# Patient Record
Sex: FEMALE | Race: ASIAN | NOT HISPANIC OR LATINO
[De-identification: names, ages, dates, MRNs, and addresses within clinical notes are randomized per-mention and may not be internally consistent; named-entity substitution may affect disease eponyms.]

---

## 2017-11-28 ENCOUNTER — APPOINTMENT (OUTPATIENT)
Dept: MAMMOGRAPHY | Facility: CLINIC | Age: 77
End: 2017-11-28
Payer: MEDICARE

## 2017-11-28 ENCOUNTER — APPOINTMENT (OUTPATIENT)
Dept: ULTRASOUND IMAGING | Facility: CLINIC | Age: 77
End: 2017-11-28
Payer: MEDICARE

## 2017-11-28 ENCOUNTER — OUTPATIENT (OUTPATIENT)
Dept: OUTPATIENT SERVICES | Facility: HOSPITAL | Age: 77
LOS: 1 days | End: 2017-11-28
Payer: MEDICARE

## 2017-11-28 DIAGNOSIS — Z00.8 ENCOUNTER FOR OTHER GENERAL EXAMINATION: ICD-10-CM

## 2017-11-28 PROCEDURE — 77063 BREAST TOMOSYNTHESIS BI: CPT | Mod: 26

## 2017-11-28 PROCEDURE — G0202: CPT | Mod: 26

## 2017-11-28 PROCEDURE — 77067 SCR MAMMO BI INCL CAD: CPT

## 2017-11-28 PROCEDURE — 76641 ULTRASOUND BREAST COMPLETE: CPT | Mod: 26,50

## 2017-11-28 PROCEDURE — 77063 BREAST TOMOSYNTHESIS BI: CPT

## 2017-11-28 PROCEDURE — 76641 ULTRASOUND BREAST COMPLETE: CPT

## 2018-02-26 ENCOUNTER — APPOINTMENT (OUTPATIENT)
Dept: VASCULAR SURGERY | Facility: CLINIC | Age: 78
End: 2018-02-26
Payer: MEDICARE

## 2018-02-26 PROCEDURE — 93923 UPR/LXTR ART STDY 3+ LVLS: CPT

## 2018-03-14 ENCOUNTER — APPOINTMENT (OUTPATIENT)
Dept: VASCULAR SURGERY | Facility: CLINIC | Age: 78
End: 2018-03-14
Payer: MEDICARE

## 2018-03-14 ENCOUNTER — LABORATORY RESULT (OUTPATIENT)
Age: 78
End: 2018-03-14

## 2018-03-14 VITALS
BODY MASS INDEX: 20.77 KG/M2 | SYSTOLIC BLOOD PRESSURE: 183 MMHG | HEIGHT: 61 IN | WEIGHT: 110 LBS | TEMPERATURE: 98.3 F | DIASTOLIC BLOOD PRESSURE: 73 MMHG | HEART RATE: 80 BPM

## 2018-03-14 DIAGNOSIS — M79.672 PAIN IN LEFT FOOT: ICD-10-CM

## 2018-03-14 DIAGNOSIS — I73.9 PERIPHERAL VASCULAR DISEASE, UNSPECIFIED: ICD-10-CM

## 2018-03-14 PROCEDURE — 99204 OFFICE O/P NEW MOD 45 MIN: CPT

## 2018-03-20 ENCOUNTER — APPOINTMENT (OUTPATIENT)
Dept: VASCULAR SURGERY | Facility: HOSPITAL | Age: 78
End: 2018-03-20

## 2018-03-20 ENCOUNTER — OUTPATIENT (OUTPATIENT)
Dept: OUTPATIENT SERVICES | Facility: HOSPITAL | Age: 78
LOS: 1 days | Discharge: HOME | End: 2018-03-20
Payer: MEDICARE

## 2018-03-20 ENCOUNTER — APPOINTMENT (OUTPATIENT)
Dept: VASCULAR SURGERY | Facility: AMBULATORY SURGERY CENTER | Age: 78
End: 2018-03-20

## 2018-03-20 DIAGNOSIS — I73.9 PERIPHERAL VASCULAR DISEASE, UNSPECIFIED: ICD-10-CM

## 2018-03-20 PROCEDURE — 99152 MOD SED SAME PHYS/QHP 5/>YRS: CPT

## 2018-03-20 PROCEDURE — 37226: CPT | Mod: LT

## 2018-03-20 PROCEDURE — 75625 CONTRAST EXAM ABDOMINL AORTA: CPT | Mod: 26,59

## 2018-03-20 PROCEDURE — 37228: CPT | Mod: LT

## 2018-03-20 PROCEDURE — 75716 ARTERY X-RAYS ARMS/LEGS: CPT | Mod: 26,59

## 2018-03-20 RX ORDER — SODIUM CHLORIDE 9 MG/ML
3 INJECTION INTRAMUSCULAR; INTRAVENOUS; SUBCUTANEOUS EVERY 8 HOURS
Qty: 0 | Refills: 0 | Status: DISCONTINUED | OUTPATIENT
Start: 2018-03-20 | End: 2018-04-04

## 2018-03-20 RX ORDER — OMEGA-3 ACID ETHYL ESTERS 1 G
0 CAPSULE ORAL
Qty: 0 | Refills: 0 | COMMUNITY

## 2018-03-20 RX ORDER — ZOLPIDEM TARTRATE 10 MG/1
1 TABLET ORAL
Qty: 0 | Refills: 0 | COMMUNITY

## 2018-03-20 RX ORDER — OMEPRAZOLE 10 MG/1
1 CAPSULE, DELAYED RELEASE ORAL
Qty: 0 | Refills: 0 | COMMUNITY

## 2018-03-20 RX ORDER — METFORMIN HYDROCHLORIDE 850 MG/1
1 TABLET ORAL
Qty: 0 | Refills: 0 | COMMUNITY

## 2018-03-20 RX ORDER — METOPROLOL TARTRATE 50 MG
1 TABLET ORAL
Qty: 0 | Refills: 0 | COMMUNITY

## 2018-03-20 RX ORDER — NIFEDIPINE 30 MG
1 TABLET, EXTENDED RELEASE 24 HR ORAL
Qty: 0 | Refills: 0 | COMMUNITY

## 2018-03-20 RX ORDER — ASPIRIN/CALCIUM CARB/MAGNESIUM 324 MG
1 TABLET ORAL
Qty: 0 | Refills: 0 | COMMUNITY

## 2018-03-20 RX ORDER — ROSUVASTATIN CALCIUM 5 MG/1
1 TABLET ORAL
Qty: 0 | Refills: 0 | COMMUNITY

## 2018-03-20 RX ORDER — CLOPIDOGREL BISULFATE 75 MG/1
1 TABLET, FILM COATED ORAL
Qty: 30 | Refills: 0 | OUTPATIENT
Start: 2018-03-20 | End: 2018-04-18

## 2018-03-20 RX ORDER — INSULIN LISPRO 100 [IU]/ML
20 INJECTION, SUSPENSION SUBCUTANEOUS
Qty: 0 | Refills: 0 | COMMUNITY

## 2018-03-20 RX ORDER — LOSARTAN/HYDROCHLOROTHIAZIDE 100MG-25MG
1 TABLET ORAL
Qty: 0 | Refills: 0 | COMMUNITY

## 2018-03-20 RX ORDER — GEMFIBROZIL 600 MG
1 TABLET ORAL
Qty: 0 | Refills: 0 | COMMUNITY

## 2018-03-20 NOTE — H&P CARDIOLOGY - HISTORY OF PRESENT ILLNESS
77 y.o. female presents today for elective LLE angiogram. The patient c/o LLE pain with walking and at rest. Admits to LLE skin color change, "became red at toe area". Denies ulcers. The patient was evaluated by her podiatrist, was referred to a vascular surgeon. was found to have abnormal ? doppler LLE study. Due to patient's symptoms and abnormal non invasive tests, the patient was recommended to have LLE angiogram/angiolplasty. 77 y.o. female presents today for elective LLE angiogram. The patient c/o LLE pain with walking and at rest. Admits to LLE skin color change, "became red at the toe area". Denies ulcers. The patient was evaluated by her podiatrist, was referred to a vascular surgeon. was found to have abnormal ? doppler LLE study. Due to patient's symptoms and abnormal non invasive tests, the patient was recommended to have LLE angiogram/angiolplasty.

## 2018-03-20 NOTE — H&P CARDIOLOGY - PMH
Breast Cancer  "LEFT breast 2002" was on arimedex for 6 years---stopped 3 years ago  Breast Lump  RIGHT 7/2011  Breast Lump  LEFT 2002  DM (Diabetes Mellitus)    Glaucoma  "borderline"  HTN (Hypertension)    Hypercholesterolemia

## 2018-03-20 NOTE — H&P CARDIOLOGY - EXTREMITIES COMMENTS
L foot - + distal erythema, no cyanosis, + nonhealing dry ulcer over medial aspect of L great toe (as per patient post corn removal by podiatrist)

## 2018-04-04 ENCOUNTER — APPOINTMENT (OUTPATIENT)
Dept: VASCULAR SURGERY | Facility: CLINIC | Age: 78
End: 2018-04-04
Payer: MEDICARE

## 2018-04-04 VITALS
SYSTOLIC BLOOD PRESSURE: 151 MMHG | WEIGHT: 110 LBS | TEMPERATURE: 97.8 F | BODY MASS INDEX: 20.77 KG/M2 | HEIGHT: 61 IN | DIASTOLIC BLOOD PRESSURE: 71 MMHG | HEART RATE: 84 BPM

## 2018-04-04 PROCEDURE — 99212 OFFICE O/P EST SF 10 MIN: CPT

## 2018-04-18 ENCOUNTER — RX RENEWAL (OUTPATIENT)
Age: 78
End: 2018-04-18

## 2018-04-18 RX ORDER — CLOPIDOGREL BISULFATE 75 MG/1
75 TABLET, FILM COATED ORAL DAILY
Qty: 30 | Refills: 3 | Status: ACTIVE | COMMUNITY
Start: 2018-04-18 | End: 1900-01-01

## 2019-07-26 ENCOUNTER — APPOINTMENT (OUTPATIENT)
Dept: ULTRASOUND IMAGING | Facility: CLINIC | Age: 79
End: 2019-07-26
Payer: MEDICARE

## 2019-07-26 ENCOUNTER — APPOINTMENT (OUTPATIENT)
Dept: MAMMOGRAPHY | Facility: CLINIC | Age: 79
End: 2019-07-26
Payer: MEDICARE

## 2019-07-26 ENCOUNTER — OUTPATIENT (OUTPATIENT)
Dept: OUTPATIENT SERVICES | Facility: HOSPITAL | Age: 79
LOS: 1 days | End: 2019-07-26
Payer: MEDICARE

## 2019-07-26 DIAGNOSIS — Z00.8 ENCOUNTER FOR OTHER GENERAL EXAMINATION: ICD-10-CM

## 2019-07-26 PROCEDURE — 77063 BREAST TOMOSYNTHESIS BI: CPT | Mod: 26

## 2019-07-26 PROCEDURE — 77063 BREAST TOMOSYNTHESIS BI: CPT

## 2019-07-26 PROCEDURE — 76641 ULTRASOUND BREAST COMPLETE: CPT | Mod: 26,50

## 2019-07-26 PROCEDURE — 77067 SCR MAMMO BI INCL CAD: CPT

## 2019-07-26 PROCEDURE — 76641 ULTRASOUND BREAST COMPLETE: CPT

## 2019-07-26 PROCEDURE — 77067 SCR MAMMO BI INCL CAD: CPT | Mod: 26

## 2023-07-23 NOTE — H&P CARDIOLOGY - CONSTITUTIONAL
[facility] Medicine Services  PROGRESS NOTE    Patient Name: Lissett Lowry  : 1950  MRN: [id number]    Date of Admission: 2023  Primary Care Physician: Konstantin Gibson MD    Subjective   Subjective     CC:  Emesis, abdominal pain    HPI:  Feels ok.  States that having some liquid stools.      ROS:  Gen- No fevers, chills  CV- No chest pain, palpitations  Resp- No cough, dyspnea  GI- No N/V/D, abd pain      Objective   Objective     Vital Signs:   Temp:  [97.8 °F (36.6 °C)-98.4 °F (36.9 °C)] 98 °F (36.7 °C)  Heart Rate:  [59-66] 65  Resp:  [16-18] 18  BP: (133-151)/(66-76) 141/66  Flow (L/min):  [2] 2     Physical Exam:  Constitutional: No acute distress, awake, alert,  at bedside  HENT: NCAT, mucous membranes moist, NGT clamped  Respiratory: Clear to auscultation bilaterally, respiratory effort normal   Cardiovascular: RRR, no murmurs, rubs, or gallops  Gastrointestinal: Positive bowel sounds, soft, nontender, nondistended, large reducible left ventral hernia  Musculoskeletal: No bilateral ankle edema  Psychiatric: Appropriate affect, cooperative  Neurologic: Oriented x 3, strength symmetric in all extremities, Cranial Nerves grossly intact to confrontation, speech clear  Skin: No rashes      Results Reviewed:  LAB RESULTS:      Lab 23  0822 23  0354 23  1125 23  0843   WBC 5.24 6.85  --  16.30*   HEMOGLOBIN 11.0* 11.2*  --  14.8   HEMATOCRIT 33.0* 33.7*  --  44.2   PLATELETS 149 191  --  294   NEUTROS ABS  --   --   --  14.58*   IMMATURE GRANS (ABS)  --   --   --  0.11*   LYMPHS ABS  --   --   --  0.83   MONOS ABS  --   --   --  0.77   EOS ABS  --   --   --  0.00   MCV 96.2 94.7  --  93.6   LACTATE  --   --  1.4 3.1*         Lab 23  1221 23  0353 23  2216 23  1752 23  0822 23  1550 23  0354 23  0843   SODIUM  --  148*  --   --  144  --  144 142   POTASSIUM  --  4.2  --  3.8 3.6 3.5 3.6 4.1    CHLORIDE  --  114*  --   --  110*  --  105 99   CO2  --  23.0  --   --  26.0  --  29.0 27.0   ANION GAP  --  11.0  --   --  8.0  --  10.0 16.0*   BUN  --  16  --   --  27*  --  64* 64*   CREATININE  --  0.57  --   --  0.66  --  1.36* 1.94*   EGFR  --  96.1  --   --  92.8  --  41.2* 27.1*   GLUCOSE  --  113*  --   --  79  --  100* 189*   CALCIUM  --  9.8  --   --  9.9  --  9.9 11.5*   MAGNESIUM  --  1.5*  --   --  1.7  --   --   --    PHOSPHORUS 2.3*  --  1.5* 1.4* 1.4*  --   --   --          Lab 07/20/23  0843   TOTAL PROTEIN 7.7   ALBUMIN 4.4   GLOBULIN 3.3   ALT (SGPT) 17   AST (SGOT) 23   BILIRUBIN 0.6   ALK PHOS 74   LIPASE 15         Lab 07/20/23  1125 07/20/23  0843   HSTROP T 30* 35*                 Brief Urine Lab Results  (Last result in the past 365 days)        Color   Clarity   Blood   Leuk Est   Nitrite   Protein   CREAT   Urine HCG        07/20/23 1209 Dark Yellow   Cloudy   Negative   Moderate (2+)   Negative   Trace                   Microbiology Results Abnormal       Procedure Component Value - Date/Time    Blood Culture - Blood, Arm, Right [[id number]]  (Normal) Collected: 07/20/23 1220    Lab Status: Preliminary result Specimen: Blood from Arm, Right Updated: 07/23/23 1245     Blood Culture No growth at 3 days            Adult Transthoracic Echo Limited W/ Cont if Necessary Per Protocol    Result Date: 7/21/2023    Left ventricular systolic function is normal. Left ventricular ejection fraction appears to be 61 - 65%.   Moderate mitral annular calcification is present.  Cannot exclude a small adherent vegetation on the posterior surface of the mitral valve.  There is no significant mitral valve stenosis or regurgitation   Other cardiac valves appear normal with no evidence of vegetation   Recommend transesophageal echocardiogram for definitive assessment for endocarditis     FL Small Bowel Follow Through Single-Contrast    Result Date: 7/22/2023  FL SMALL BOWEL FOLLOW THROUGH SINGLE-CONTRAST  Date of Exam: 7/22/2023 10:00 AM EDT Indication: sbo. Comparison: None available. Technique:   image of the abdomen was obtained. Patient ingested medium density barium for single contrast imaging of the small bowel.  Examination was recorded with multiple large field of view radiographs and spot fluoroscopic images. Findings: Initial  view of the abdomen demonstrates gas-filled segments of both small and large bowel, without evidence of obstruction. A nasogastric tube is noted in the stomach. There is no overt pneumoperitoneum. Water-soluble contrast was subsequently administered via the nasogastric tube. Subsequent 30-minute, 2-hour and 4-hour images demonstrate distal progression of contrast, with right colon visualized on the 4-hour image. There is no extravasation of contrast.     Impression: Impression: Somewhat delayed transit of contrast with gas-filled segments of both small and large bowel suggesting ileus. No evidence of high-grade obstruction. Electronically Signed: Tyrone Ceja  7/22/2023 7:34 PM EDT  Workstation ID: [id number]     Results for orders placed during the hospital encounter of 07/20/23    Adult Transthoracic Echo Limited W/ Cont if Necessary Per Protocol    Interpretation Summary    Left ventricular systolic function is normal. Left ventricular ejection fraction appears to be 61 - 65%.    Moderate mitral annular calcification is present.  Cannot exclude a small adherent vegetation on the posterior surface of the mitral valve.  There is no significant mitral valve stenosis or regurgitation    Other cardiac valves appear normal with no evidence of vegetation    Recommend transesophageal echocardiogram for definitive assessment for endocarditis      Current medications:  Scheduled Meds:ceFAZolin, 2,000 mg, Intravenous, Q8H  famotidine, 20 mg, Oral, BID AC  heparin (porcine), 5,000 Units, Subcutaneous, Q12H  insulin regular, 2-7 Units, Subcutaneous, Q6H  nebivolol, 10 mg, Oral,  Daily  polyethylene glycol, 17 g, Oral, Daily  rosuvastatin, 5 mg, Oral, Nightly  sertraline, 100 mg, Oral, Daily  sodium chloride, 10 mL, Intravenous, Q12H      Continuous Infusions:dextrose 5 % and sodium chloride 0.45 % with KCl 20 mEq/L, 75 mL/hr, Last Rate: 75 mL/hr (07/22/23 6448)      PRN Meds:.  acetaminophen    Calcium Replacement - Follow Nurse / BPA Driven Protocol    dextrose    dextrose    glucagon (human recombinant)    Magnesium Standard Dose Replacement - Follow Nurse / BPA Driven Protocol    Morphine **AND** naloxone    Phosphorus Replacement - Follow Nurse / BPA Driven Protocol    Potassium Replacement - Follow Nurse / BPA Driven Protocol    sodium chloride    sodium chloride    Assessment & Plan   Assessment & Plan     Active Hospital Problems    Diagnosis  POA    **SBO (small bowel obstruction) [K56.609]  Yes    Severe malnutrition [E43]  Yes    Sjogren syndrome, unspecified [M35.00]  Unknown    HTN (hypertension) [I10]  Yes    HLD (hyperlipidemia) [E78.5]  Yes    PHYLLIS on CPAP [G47.33, Z99.89]  Not Applicable    Controlled type 2 diabetes mellitus without complication, with long-term current use of insulin [E11.9, Z79.4]  Not Applicable      Resolved Hospital Problems   No resolved problems to display.        Brief Hospital Course to date:  Lissett Lowry is a 73 y.o. female  w h/o large abdominal hernia, undergoing OP w/u for surgery w Dr FAYE, DMII, h/o PE 2021 gary-op who presents after 2+ days of abdominal pain, dark emesis. Found to have SBO 2/2 abdominal wall hernia     SBO 2/2 abdominal wall hernia  -surgery following.  Recs NPO,  IVF.  Holding xarelto  -having BM's.  SBFT somewhat delayed/?ileus, without gross obstruction.  Recs abdominal binder at all times.   Started clear liquids      RLL PNA, likely aspiration in setting of significant vomiting  -Note has hives to cefadroxil which has side chain in common w unasyn + zosyn. -Continue cefazolin  -ID following.     MSSA  --ID following.   Recs cefazolin, plans PICC which can be placed after blood cultures cleared x 72hours, plans for at least 4 weeks IV abx.  Follow cultures.    --echo shows EF 61-65%, CAN NOT Exclude small vegetation on posterior surface of MV.  Will need JENNIFER, ordered for AM.  NPO after MN    H/o PE 2021 - PE was perioperative in 2021, on low-dose xarelto since. Last dose 7/17 PM. Will hold in case need for surgery, DVT ppx throughout gary-op period given history     KARLA  --resolved with IVF  --holding bumex     Chronic bumex use - family denies h/o CHF but on high dose bumex. Hold for now given dehydration.      DMII with hypoglycemia  - hold ozempic. Low-dose  SSI  --has been on D5 1/2NS to maintain glucose but sodium increasing so change IVF to D5W at 25/hr. Hopefully glucose will improve as diet advanced    Sjogren - hold HCQ for now  HLD - statin  HTN - continue nebivolol, hold other BP meds  Mood disorder - home sertraline  Multiple listed medication allergies - complicates care, consider allergy referral as OP    D/w  at bedside on 7/23/23      Expected Discharge Location and Transportation:   Expected Discharge   Expected Discharge Date: 7/27/2023; Expected Discharge Time:      DVT prophylaxis:  Medical DVT prophylaxis orders are present.     AM-PAC 6 Clicks Score (PT): 22 (07/22/23 2100)    CODE STATUS:   Code Status and Medical Interventions:   Ordered at: 07/20/23 1417     Level Of Support Discussed With:    Patient    Next of Kin (If No Surrogate)     Code Status (Patient has no pulse and is not breathing):    CPR (Attempt to Resuscitate)     Medical Interventions (Patient has pulse or is breathing):    Full Support     Release to patient:    Routine Release       Durga Garcia MD  07/23/23       Well-developed, well nourished